# Patient Record
Sex: MALE | Race: WHITE | NOT HISPANIC OR LATINO | Employment: FULL TIME | URBAN - METROPOLITAN AREA
[De-identification: names, ages, dates, MRNs, and addresses within clinical notes are randomized per-mention and may not be internally consistent; named-entity substitution may affect disease eponyms.]

---

## 2022-10-14 ENCOUNTER — HOSPITAL ENCOUNTER (INPATIENT)
Facility: HOSPITAL | Age: 57
LOS: 3 days | Discharge: HOME | DRG: 854 | End: 2022-10-17
Attending: EMERGENCY MEDICINE | Admitting: INTERNAL MEDICINE
Payer: MEDICAID

## 2022-10-14 DIAGNOSIS — N20.0 NEPHROLITHIASIS: ICD-10-CM

## 2022-10-14 DIAGNOSIS — R10.9 FLANK PAIN: Primary | ICD-10-CM

## 2022-10-14 DIAGNOSIS — N20.1 URETERAL CALCULI: ICD-10-CM

## 2022-10-14 DIAGNOSIS — N20.1 URETEROLITHIASIS: ICD-10-CM

## 2022-10-14 LAB
ABS NEUT (OLG): 9.94 X10(3)/MCL (ref 2.1–9.2)
ALBUMIN SERPL-MCNC: 3.3 GM/DL (ref 3.5–5)
ALBUMIN/GLOB SERPL: 0.9 RATIO (ref 1.1–2)
ALP SERPL-CCNC: 75 UNIT/L (ref 40–150)
ALT SERPL-CCNC: 33 UNIT/L (ref 0–55)
APPEARANCE UR: CLEAR
AST SERPL-CCNC: 26 UNIT/L (ref 5–34)
BACTERIA #/AREA URNS AUTO: ABNORMAL /HPF
BILIRUB UR QL STRIP.AUTO: NEGATIVE MG/DL
BILIRUBIN DIRECT+TOT PNL SERPL-MCNC: 0.9 MG/DL
BUN SERPL-MCNC: 12.6 MG/DL (ref 8.4–25.7)
CALCIUM SERPL-MCNC: 9.1 MG/DL (ref 8.4–10.2)
CHLORIDE SERPL-SCNC: 99 MMOL/L (ref 98–107)
CO2 SERPL-SCNC: 26 MMOL/L (ref 22–29)
COLOR UR AUTO: ABNORMAL
CREAT SERPL-MCNC: 1.41 MG/DL (ref 0.73–1.18)
EOSINOPHIL NFR BLD MANUAL: 0.42 X10(3)/MCL (ref 0–0.9)
EOSINOPHIL NFR BLD MANUAL: 3 %
ERYTHROCYTE [DISTWIDTH] IN BLOOD BY AUTOMATED COUNT: 12.1 % (ref 11.5–17)
GFR SERPLBLD CREATININE-BSD FMLA CKD-EPI: 58 MLS/MIN/1.73/M2
GLOBULIN SER-MCNC: 3.8 GM/DL (ref 2.4–3.5)
GLUCOSE SERPL-MCNC: 100 MG/DL (ref 74–100)
GLUCOSE UR QL STRIP.AUTO: NEGATIVE MG/DL
HCT VFR BLD AUTO: 41.5 % (ref 42–52)
HGB BLD-MCNC: 13.8 GM/DL (ref 14–18)
IMM GRANULOCYTES # BLD AUTO: 0.1 X10(3)/MCL (ref 0–0.04)
IMM GRANULOCYTES NFR BLD AUTO: 0.7 %
INSTRUMENT WBC (OLG): 14 X10(3)/MCL
KETONES UR QL STRIP.AUTO: ABNORMAL MG/DL
LEUKOCYTE ESTERASE UR QL STRIP.AUTO: ABNORMAL UNIT/L
LYMPHOCYTES NFR BLD MANUAL: 0.98 X10(3)/MCL
LYMPHOCYTES NFR BLD MANUAL: 7 %
MCH RBC QN AUTO: 31.4 PG (ref 27–31)
MCHC RBC AUTO-ENTMCNC: 33.3 MG/DL (ref 33–36)
MCV RBC AUTO: 94.3 FL (ref 80–94)
MONOCYTES NFR BLD MANUAL: 19 %
MONOCYTES NFR BLD MANUAL: 2.66 X10(3)/MCL (ref 0.1–1.3)
NEUTROPHILS NFR BLD MANUAL: 71 %
NITRITE UR QL STRIP.AUTO: NEGATIVE
NRBC BLD AUTO-RTO: 0 %
PH UR STRIP.AUTO: 5.5 [PH]
PLATELET # BLD AUTO: 297 X10(3)/MCL (ref 130–400)
PLATELET # BLD EST: NORMAL 10*3/UL
PMV BLD AUTO: 11.5 FL (ref 7.4–10.4)
POTASSIUM SERPL-SCNC: 4.7 MMOL/L (ref 3.5–5.1)
PROT SERPL-MCNC: 7.1 GM/DL (ref 6.4–8.3)
PROT UR QL STRIP.AUTO: ABNORMAL MG/DL
RBC # BLD AUTO: 4.4 X10(6)/MCL (ref 4.7–6.1)
RBC #/AREA URNS AUTO: <5 /HPF
RBC MORPH BLD: NORMAL
RBC UR QL AUTO: NEGATIVE UNIT/L
SARS-COV-2 RDRP RESP QL NAA+PROBE: NEGATIVE
SODIUM SERPL-SCNC: 130 MMOL/L (ref 136–145)
SP GR UR STRIP.AUTO: 1.04 (ref 1–1.03)
SQUAMOUS #/AREA URNS AUTO: <5 /HPF
UROBILINOGEN UR STRIP-ACNC: 1 MG/DL
WBC # SPEC AUTO: 14 X10(3)/MCL (ref 4.5–11.5)
WBC #/AREA URNS AUTO: 7 /HPF

## 2022-10-14 PROCEDURE — 81001 URINALYSIS AUTO W/SCOPE: CPT | Performed by: PHYSICIAN ASSISTANT

## 2022-10-14 PROCEDURE — 96361 HYDRATE IV INFUSION ADD-ON: CPT

## 2022-10-14 PROCEDURE — 63600175 PHARM REV CODE 636 W HCPCS: Performed by: STUDENT IN AN ORGANIZED HEALTH CARE EDUCATION/TRAINING PROGRAM

## 2022-10-14 PROCEDURE — 25000003 PHARM REV CODE 250: Performed by: PHYSICIAN ASSISTANT

## 2022-10-14 PROCEDURE — 63600175 PHARM REV CODE 636 W HCPCS: Performed by: INTERNAL MEDICINE

## 2022-10-14 PROCEDURE — 99285 EMERGENCY DEPT VISIT HI MDM: CPT | Mod: 25

## 2022-10-14 PROCEDURE — 96376 TX/PRO/DX INJ SAME DRUG ADON: CPT

## 2022-10-14 PROCEDURE — 87635 SARS-COV-2 COVID-19 AMP PRB: CPT | Performed by: STUDENT IN AN ORGANIZED HEALTH CARE EDUCATION/TRAINING PROGRAM

## 2022-10-14 PROCEDURE — 11000001 HC ACUTE MED/SURG PRIVATE ROOM

## 2022-10-14 PROCEDURE — 85027 COMPLETE CBC AUTOMATED: CPT | Performed by: PHYSICIAN ASSISTANT

## 2022-10-14 PROCEDURE — 80053 COMPREHEN METABOLIC PANEL: CPT | Performed by: PHYSICIAN ASSISTANT

## 2022-10-14 PROCEDURE — 96374 THER/PROPH/DIAG INJ IV PUSH: CPT

## 2022-10-14 PROCEDURE — 36415 COLL VENOUS BLD VENIPUNCTURE: CPT | Performed by: PHYSICIAN ASSISTANT

## 2022-10-14 PROCEDURE — 25000003 PHARM REV CODE 250: Performed by: INTERNAL MEDICINE

## 2022-10-14 RX ORDER — SODIUM CHLORIDE, SODIUM LACTATE, POTASSIUM CHLORIDE, CALCIUM CHLORIDE 600; 310; 30; 20 MG/100ML; MG/100ML; MG/100ML; MG/100ML
INJECTION, SOLUTION INTRAVENOUS CONTINUOUS
Status: ACTIVE | OUTPATIENT
Start: 2022-10-14 | End: 2022-10-15

## 2022-10-14 RX ORDER — MORPHINE SULFATE 4 MG/ML
2 INJECTION, SOLUTION INTRAMUSCULAR; INTRAVENOUS EVERY 4 HOURS PRN
Status: DISCONTINUED | OUTPATIENT
Start: 2022-10-14 | End: 2022-10-17 | Stop reason: HOSPADM

## 2022-10-14 RX ORDER — TAMSULOSIN HYDROCHLORIDE 0.4 MG/1
1 CAPSULE ORAL NIGHTLY
COMMUNITY
Start: 2022-10-12

## 2022-10-14 RX ORDER — LEVOFLOXACIN 500 MG/1
500 TABLET, FILM COATED ORAL DAILY
Status: DISCONTINUED | OUTPATIENT
Start: 2022-10-15 | End: 2022-10-17 | Stop reason: HOSPADM

## 2022-10-14 RX ORDER — TALC
6 POWDER (GRAM) TOPICAL NIGHTLY PRN
Status: DISCONTINUED | OUTPATIENT
Start: 2022-10-14 | End: 2022-10-17 | Stop reason: HOSPADM

## 2022-10-14 RX ORDER — HYDROCODONE BITARTRATE AND ACETAMINOPHEN 10; 325 MG/1; MG/1
1 TABLET ORAL EVERY 4 HOURS PRN
COMMUNITY
Start: 2022-10-13

## 2022-10-14 RX ORDER — HYDROCODONE BITARTRATE AND ACETAMINOPHEN 10; 325 MG/1; MG/1
1 TABLET ORAL EVERY 4 HOURS PRN
Status: DISCONTINUED | OUTPATIENT
Start: 2022-10-14 | End: 2022-10-17 | Stop reason: HOSPADM

## 2022-10-14 RX ORDER — HYDROMORPHONE HYDROCHLORIDE 2 MG/ML
0.5 INJECTION, SOLUTION INTRAMUSCULAR; INTRAVENOUS; SUBCUTANEOUS
Status: COMPLETED | OUTPATIENT
Start: 2022-10-14 | End: 2022-10-14

## 2022-10-14 RX ORDER — LEVOFLOXACIN 500 MG/1
500 TABLET, FILM COATED ORAL DAILY
Status: ON HOLD | COMMUNITY
Start: 2022-10-12 | End: 2022-10-17 | Stop reason: SDUPTHER

## 2022-10-14 RX ORDER — HYDROMORPHONE HYDROCHLORIDE 2 MG/ML
1 INJECTION, SOLUTION INTRAMUSCULAR; INTRAVENOUS; SUBCUTANEOUS EVERY 4 HOURS PRN
Status: DISCONTINUED | OUTPATIENT
Start: 2022-10-14 | End: 2022-10-17 | Stop reason: HOSPADM

## 2022-10-14 RX ORDER — ONDANSETRON 2 MG/ML
4 INJECTION INTRAMUSCULAR; INTRAVENOUS EVERY 8 HOURS PRN
Status: DISCONTINUED | OUTPATIENT
Start: 2022-10-14 | End: 2022-10-17 | Stop reason: HOSPADM

## 2022-10-14 RX ORDER — TAMSULOSIN HYDROCHLORIDE 0.4 MG/1
1 CAPSULE ORAL NIGHTLY
Status: DISCONTINUED | OUTPATIENT
Start: 2022-10-14 | End: 2022-10-17 | Stop reason: HOSPADM

## 2022-10-14 RX ORDER — SODIUM CHLORIDE 0.9 % (FLUSH) 0.9 %
10 SYRINGE (ML) INJECTION
Status: DISCONTINUED | OUTPATIENT
Start: 2022-10-14 | End: 2022-10-17 | Stop reason: HOSPADM

## 2022-10-14 RX ADMIN — HYDROCODONE BITARTRATE AND ACETAMINOPHEN 1 TABLET: 10; 325 TABLET ORAL at 09:10

## 2022-10-14 RX ADMIN — HYDROMORPHONE HYDROCHLORIDE 0.5 MG: 2 INJECTION INTRAMUSCULAR; INTRAVENOUS; SUBCUTANEOUS at 12:10

## 2022-10-14 RX ADMIN — SODIUM CHLORIDE, POTASSIUM CHLORIDE, SODIUM LACTATE AND CALCIUM CHLORIDE 1000 ML: 600; 310; 30; 20 INJECTION, SOLUTION INTRAVENOUS at 02:10

## 2022-10-14 RX ADMIN — TAMSULOSIN HYDROCHLORIDE 0.4 MG: 0.4 CAPSULE ORAL at 09:10

## 2022-10-14 RX ADMIN — HYDROMORPHONE HYDROCHLORIDE 0.5 MG: 2 INJECTION INTRAMUSCULAR; INTRAVENOUS; SUBCUTANEOUS at 04:10

## 2022-10-14 RX ADMIN — SODIUM CHLORIDE, POTASSIUM CHLORIDE, SODIUM LACTATE AND CALCIUM CHLORIDE: 600; 310; 30; 20 INJECTION, SOLUTION INTRAVENOUS at 09:10

## 2022-10-14 RX ADMIN — SODIUM CHLORIDE 1000 ML: 9 INJECTION, SOLUTION INTRAVENOUS at 11:10

## 2022-10-14 NOTE — CONSULTS
Steven Lejeune 1965  [id number]  10/14/2022    CONSULTING PHYSICIAN: ED    CC:  Left ureteral stone      HPI:  The patient is a 57-year-old man who was working and cut off [place].  He developed left flank pain on Tuesday.  He was seen in the emergency department and had a CT scan which demonstrated a left distal ureteral stone.  Patient had a scheduled outpatient follow-up with his urologist in [place] in November.  However he decided he could not wait that long and presented to our emergency department.  Pain is improved with IV pain medicines.  No fever or chills.  No voiding symptoms or blood in the urine.  No other complaints at this time      No past medical history on file.    No past surgical history on file.    No family history on file.         No current facility-administered medications for this encounter.     Current Outpatient Medications   Medication Sig Dispense Refill    HYDROcodone-acetaminophen (NORCO)  mg per tablet Take 1 tablet by mouth every 4 (four) hours as needed. for pain.      levoFLOXacin (LEVAQUIN) 500 MG tablet Take 500 mg by mouth once daily.      tamsulosin (FLOMAX) 0.4 mg Cap Take 1 capsule by mouth every evening. at bedtime         Review of patient's allergies indicates:  No Known Allergies    ROS: 12 point review of systems negative other than the HPI      PHYSICAL EXAM:  Vitals:    10/14/22 1216 10/14/22 1400 10/14/22 1612 10/14/22 1656   BP:  (!) 114/58 132/79    BP Location:       Patient Position:       Pulse:  104 97    Resp: (!) 21 (!) 21  19   Temp:       TempSrc:       SpO2:  97% 97%    Weight:       Height:             Intake/Output Summary (Last 24 hours) at 10/14/2022 1733  Last data filed at 10/14/2022 1503  Gross per 24 hour   Intake 1999 ml   Output --   Net 1999 ml       GEN: WN/WD NAD  HEENT: NCAT, PERRLA, EOMI, OP clear, nares patent  CV: RRR  RESP: Even and unlabored  ABD:  Soft nontender nondistended  :  Deferred  EXT: no C/C/E  NEURO: no focal  brandyn, WOOD, AAOx4      LABS:  [unfilled]      Recent Results (from the past 24 hour(s))   Urinalysis, Reflex to Urine Culture Urine, Clean Catch    Collection Time: 10/14/22 11:05 AM    Specimen: Urine   Result Value Ref Range    Color, UA Dark Yellow Yellow, Light-Yellow, Dark Yellow, Gloria, Straw    Appearance, UA Clear Clear    Specific Gravity, UA 1.037 (H) 1.001 - 1.030    pH, UA 5.5 5.0, 5.5, 6.0, 6.5, 7.0, 7.5, 8.0, 8.5    Protein, UA 1+ (A) Negative mg/dL    Glucose, UA Negative Negative, Normal mg/dL    Ketones, UA Trace (A) Negative mg/dL    Blood, UA Negative Negative unit/L    Bilirubin, UA Negative Negative mg/dL    Urobilinogen, UA 1.0 0.2, 1.0, Normal mg/dL    Nitrites, UA Negative Negative    Leukocyte Esterase, UA Trace (A) Negative unit/L   Urinalysis, Microscopic    Collection Time: 10/14/22 11:05 AM   Result Value Ref Range    RBC, UA <5 <=5 /HPF    WBC, UA 7 (H) <=5 /HPF    Squamous Epithelial Cells, UA <5 <=5 /HPF    Bacteria, UA None Seen None Seen, Rare, Occasional /HPF   CBC with Differential    Collection Time: 10/14/22 11:58 AM   Result Value Ref Range    WBC 14.0 (H) 4.5 - 11.5 x10(3)/mcL    RBC 4.40 (L) 4.70 - 6.10 x10(6)/mcL    Hgb 13.8 (L) 14.0 - 18.0 gm/dL    Hct 41.5 (L) 42.0 - 52.0 %    MCV 94.3 (H) 80.0 - 94.0 fL    MCH 31.4 (H) 27.0 - 31.0 pg    MCHC 33.3 33.0 - 36.0 mg/dL    RDW 12.1 11.5 - 17.0 %    Platelet 297 130 - 400 x10(3)/mcL    MPV 11.5 (H) 7.4 - 10.4 fL    IG# 0.10 (H) 0 - 0.04 x10(3)/mcL    IG% 0.7 %    NRBC% 0.0 %   Manual Differential    Collection Time: 10/14/22 11:58 AM   Result Value Ref Range    Neut Man 71 %    Lymph Man 7 %    Monocyte Man 19 %    Eos Man 3 %    Instr WBC 14 x10(3)/mcL    Abs Mono 2.66 (H) 0.1 - 1.3 x10(3)/mcL    Abs Eos  0.42 0 - 0.9 x10(3)/mcL    Abs Lymp 0.98 0.6 - 4.6 x10(3)/mcL    Abs Neut 9.94 (H) 2.1 - 9.2 x10(3)/mcL    RBC Morph Normal Normal    Platelet Est Normal Normal, Adequate   Comprehensive metabolic panel    Collection Time:  10/14/22 12:16 PM   Result Value Ref Range    Sodium Level 130 (L) 136 - 145 mmol/L    Potassium Level 4.7 3.5 - 5.1 mmol/L    Chloride 99 98 - 107 mmol/L    Carbon Dioxide 26 22 - 29 mmol/L    Glucose Level 100 74 - 100 mg/dL    Blood Urea Nitrogen 12.6 8.4 - 25.7 mg/dL    Creatinine 1.41 (H) 0.73 - 1.18 mg/dL    Calcium Level Total 9.1 8.4 - 10.2 mg/dL    Protein Total 7.1 6.4 - 8.3 gm/dL    Albumin Level 3.3 (L) 3.5 - 5.0 gm/dL    Globulin 3.8 (H) 2.4 - 3.5 gm/dL    Albumin/Globulin Ratio 0.9 (L) 1.1 - 2.0 ratio    Bilirubin Total 0.9 <=1.5 mg/dL    Alkaline Phosphatase 75 40 - 150 unit/L    Alanine Aminotransferase 33 0 - 55 unit/L    Aspartate Aminotransferase 26 5 - 34 unit/L    eGFR 58 mls/min/1.73/m2         IMAGING:  Imaging Results    None       Although I do not have the report from his prior imaging, I do have the images themselves to review.  He has a lower pole left partial staghorn calculus not creating any obstruction or problems.  He also has a left distal ureteral stone.      ASSESSMENT:  Ureteral stone    PLAN:  I did have a long conversation with the patient regarding his imaging findings.  He is having pain despite IV pain medicines.  We will keep him NPO overnight plane ureteroscopy laser lithotripsy.  Risks benefits rationale were discussed.  Patient does understand this will treat his distal ureteral stone and then he can return to his primary urologist for treatment of his partial staghorn calculus.  All questions are answered to his satisfaction.  Consents were signed.  Risks benefits rationale discussed    Chavo Flores MD

## 2022-10-14 NOTE — FIRST PROVIDER EVALUATION
"Medical screening examination initiated.  I have conducted a focused provider triage encounter, findings are as follows:    Chief Complaint   Patient presents with    Flank Pain    Vomiting    Nausea     Left flank pain x5 days ago, went to [facility] ER 2 days ago and was dx with ureterolithiasis, CT scan done. Referred to urologist however patient reports pain worse, denies N/V.      Brief history of present illness:  57 y.o. male presents to the ED with worsening left flank for the last 5 days. Seen on 10/12 at [facility] in [place] where he was diagnosed with kdiney stone and dc'ed home. CT scan done noting severe left hydronephrosis s/t 8mm obstructing stone with possible pyelonephritis; dc'ed home with pain medication and urology f/u however not able to get appt until November. Notes decrease in urination. Denies fever, n/v    Vitals:    10/14/22 1029   BP: 119/64   BP Location: Left arm   Patient Position: Sitting   Pulse: 101   Resp: 17   Temp: 98.2 °F (36.8 °C)   TempSrc: Oral   SpO2: 99%   Weight: 83 kg (183 lb)   Height: 5' 11.65" (1.82 m)       Pertinent physical exam:  Awake, alert, ambulatory, non-labored respirations    Brief workup plan:  labs, UA    Preliminary workup initiated; this workup will be continued and followed by the physician or advanced practice provider that is assigned to the patient when roomed.  "

## 2022-10-14 NOTE — ED PROVIDER NOTES
Encounter Date: 10/14/2022    SCRIBE #1 NOTE: I, Bahman Abbey, am scribing for, and in the presence of,  Simeon Rodrigues IV, MD. I have scribed the following portions of the note - Other sections scribed: HPI, ROS, PE.     History     Chief Complaint   Patient presents with    Flank Pain    Vomiting    Nausea     Left flank pain x5 days ago, went to [facility] ER 2 days ago and was dx with ureterolithiasis, CT scan done. Referred to urologist however patient reports pain worse, denies N/V.      56 y/o male with a recent diagnosis kidney stones presents to the ED with sharp, worsening left flank pain - radiates to LLQ. + dark urine. Denies nausea, vomiting, and fever. Associated dysuria, hematuria. Reports he couldn't get urology follow up until November.      Chart review: Pt was diagnosed with kidney stones and a UTI during his visit at [facility] ER 2 days ago. His CT showed a left-sided 8mm ureteral stone with hydronephrosis and a left-sided 2cm staghorn renal calculus. He was prescribed Norco, Flomax, and Levaquin and told to follow up with a urologist.     The history is provided by the patient. No  was used.   Flank Pain  This is a recurrent problem. Episode onset: worsened today. The problem occurs constantly. Progression since onset: worsening. Associated symptoms include abdominal pain. Pertinent negatives include no chest pain and no shortness of breath.   Review of patient's allergies indicates:  No Known Allergies  History reviewed. No pertinent past medical history.  History reviewed. No pertinent surgical history.  History reviewed. No pertinent family history.     Review of Systems   Constitutional:  Negative for chills and fever.   HENT:  Negative for congestion, rhinorrhea and sore throat.    Eyes:  Negative for visual disturbance.   Respiratory:  Negative for cough and shortness of breath.    Cardiovascular:  Negative for chest pain.   Gastrointestinal:  Positive for  abdominal pain. Negative for nausea and vomiting.   Genitourinary:  Positive for decreased urine volume and flank pain (Left). Negative for dysuria and hematuria.        Pain while urinating   Musculoskeletal:  Positive for back pain (Left). Negative for joint swelling.   Skin:  Negative for rash.   Neurological:  Negative for weakness.   Psychiatric/Behavioral:  Negative for confusion.    All other systems reviewed and are negative.    Physical Exam     Initial Vitals [10/14/22 1029]   BP Pulse Resp Temp SpO2   119/64 101 17 98.2 °F (36.8 °C) 99 %      MAP       --         Physical Exam    Nursing note and vitals reviewed.  Constitutional: He is not diaphoretic. No distress.   HENT:   Head: Normocephalic and atraumatic.   Eyes: EOM are normal. Pupils are equal, round, and reactive to light.   Neck: Neck supple.   Normal range of motion.  Cardiovascular:  Normal rate and regular rhythm.           No murmur heard.  Pulmonary/Chest: Breath sounds normal. No respiratory distress. He has no wheezes. He has no rales.   Abdominal: Abdomen is soft. He exhibits no distension. There is abdominal tenderness in the left lower quadrant.   There is left CVA tenderness.   Musculoskeletal:         General: No edema. Normal range of motion.      Cervical back: Normal range of motion and neck supple.     Neurological: He is alert and oriented to person, place, and time. He has normal strength. No cranial nerve deficit.   Skin: Skin is warm. No rash noted.   Psychiatric: He has a normal mood and affect.       ED Course   Procedures  Labs Reviewed   COMPREHENSIVE METABOLIC PANEL - Abnormal; Notable for the following components:       Result Value    Sodium Level 130 (*)     Creatinine 1.41 (*)     Albumin Level 3.3 (*)     Globulin 3.8 (*)     Albumin/Globulin Ratio 0.9 (*)     All other components within normal limits   CBC WITH DIFFERENTIAL - Abnormal; Notable for the following components:    WBC 14.0 (*)     RBC 4.40 (*)     Hgb  13.8 (*)     Hct 41.5 (*)     MCV 94.3 (*)     MCH 31.4 (*)     MPV 11.5 (*)     IG# 0.10 (*)     All other components within normal limits   URINALYSIS, REFLEX TO URINE CULTURE - Abnormal; Notable for the following components:    Specific Gravity, UA 1.037 (*)     Protein, UA 1+ (*)     Ketones, UA Trace (*)     Leukocyte Esterase, UA Trace (*)     All other components within normal limits   URINALYSIS, MICROSCOPIC - Abnormal; Notable for the following components:    WBC, UA 7 (*)     All other components within normal limits   MANUAL DIFFERENTIAL - Abnormal; Notable for the following components:    Abs Mono 2.66 (*)     Abs Neut 9.94 (*)     All other components within normal limits   SARS-COV-2 RNA AMPLIFICATION, QUAL - Normal    Narrative:     The IDNOW COVID-19 assay is a rapid molecular in vitro diagnostic test utilizing an isothermal nucleic acid amplification technology intended for the qualitative detection of nucleic acid from the SARS-CoV-2 viral RNA in direct nasal, nasopharyngeal or throat swabs from individuals who are suspected of COVID-19 by their healthcare provider.   CBC W/ AUTO DIFFERENTIAL    Narrative:     The following orders were created for panel order CBC auto differential.  Procedure                               Abnormality         Status                     ---------                               -----------         ------                     CBC with Differential[[id number]]        Abnormal            Final result               Manual Differential[[id number]]          Abnormal            Final result                 Please view results for these tests on the individual orders.          Imaging Results    None          Medications   sodium chloride 0.9% bolus 1,000 mL (0 mLs Intravenous Stopped 10/14/22 1254)   HYDROmorphone (PF) injection 0.5 mg (0.5 mg Intravenous Given 10/14/22 1216)   lactated ringers bolus 1,000 mL (0 mLs Intravenous Stopped 10/14/22 1503)   HYDROmorphone (PF)  injection 0.5 mg (0.5 mg Intravenous Given 10/14/22 7526)     Medical Decision Making:   History:   Old Medical Records: I decided to obtain old medical records.  Initial Assessment:   56 yo with 8 mm ureteral stone, L staghorn calculus diagnosed at OSH 2 days ago. Pain unrelieved here, reports cannot get urology follow up until November. Consulted urology - recommend admission, procedure in AM. Vitals wnl. Mild BRYAN. no signs of infection at this time on UA although has been on abx outpatient.   Clinical Tests:   Lab Tests: Ordered and Reviewed  Radiological Study: Ordered and Reviewed  ED Management:  IV opioids   IVF   Other:   I have discussed this case with another health care provider.       <> Summary of the Discussion: Urology  Hospitalist      Scribe Attestation:   Scribe #1: I performed the above scribed service and the documentation accurately describes the services I performed. I attest to the accuracy of the note.    Attending Attestation:           Physician Attestation for Scribe:  Physician Attestation Statement for Scribe #1: Simeon IZQUIERDO IV, MD, reviewed documentation, as scribed by Bahman Potter in my presence, and it is both accurate and complete.           ED Course as of 10/14/22 2137   Fri Oct 14, 2022   1330 Urology paged [JG]   1447 NP Veena will discuss the case with Dr. Flores [JG]   1633 Urology paged [JG]   1645 Hospitalist paged [JG]   1649 Urology recommends hospitalist admission, NPO after midnight  [AC]   1056 Patient admitted to Dr. Giovani Dominique [JG]      ED Course User Index  [AC] Simeon Rodrigues IV, MD  [JG] Bahman Potter                   Clinical Impression:   Final diagnoses:  [R10.9] Flank pain (Primary)  [N20.1] Ureterolithiasis  [N20.0] Nephrolithiasis      ED Disposition Condition    Admit Stable        Simeon IZQUIERDO MD personally performed the history, PE, MDM, and procedures as documented above and agree with the scribe's documentation.           Simeon Rodrigues  MD DAQUAN  10/14/22 3985

## 2022-10-15 ENCOUNTER — ANESTHESIA EVENT (OUTPATIENT)
Dept: SURGERY | Facility: HOSPITAL | Age: 57
DRG: 854 | End: 2022-10-15
Payer: MEDICAID

## 2022-10-15 ENCOUNTER — ANESTHESIA (OUTPATIENT)
Dept: SURGERY | Facility: HOSPITAL | Age: 57
DRG: 854 | End: 2022-10-15
Payer: MEDICAID

## 2022-10-15 LAB
ALBUMIN SERPL-MCNC: 2.6 GM/DL (ref 3.5–5)
ALBUMIN/GLOB SERPL: 0.7 RATIO (ref 1.1–2)
ALP SERPL-CCNC: 80 UNIT/L (ref 40–150)
ALT SERPL-CCNC: 29 UNIT/L (ref 0–55)
AST SERPL-CCNC: 23 UNIT/L (ref 5–34)
BASOPHILS # BLD AUTO: 0.04 X10(3)/MCL (ref 0–0.2)
BASOPHILS NFR BLD AUTO: 0.3 %
BILIRUBIN DIRECT+TOT PNL SERPL-MCNC: 1 MG/DL
BUN SERPL-MCNC: 12.8 MG/DL (ref 8.4–25.7)
CALCIUM SERPL-MCNC: 9.2 MG/DL (ref 8.4–10.2)
CHLORIDE SERPL-SCNC: 98 MMOL/L (ref 98–107)
CO2 SERPL-SCNC: 26 MMOL/L (ref 22–29)
CREAT SERPL-MCNC: 1.21 MG/DL (ref 0.73–1.18)
EOSINOPHIL # BLD AUTO: 0.33 X10(3)/MCL (ref 0–0.9)
EOSINOPHIL NFR BLD AUTO: 2.6 %
ERYTHROCYTE [DISTWIDTH] IN BLOOD BY AUTOMATED COUNT: 11.9 % (ref 11.5–17)
GFR SERPLBLD CREATININE-BSD FMLA CKD-EPI: >60 MLS/MIN/1.73/M2
GLOBULIN SER-MCNC: 3.7 GM/DL (ref 2.4–3.5)
GLUCOSE SERPL-MCNC: 99 MG/DL (ref 74–100)
HCT VFR BLD AUTO: 36 % (ref 42–52)
HGB BLD-MCNC: 12.1 GM/DL (ref 14–18)
IMM GRANULOCYTES # BLD AUTO: 0.05 X10(3)/MCL (ref 0–0.04)
IMM GRANULOCYTES NFR BLD AUTO: 0.4 %
LYMPHOCYTES # BLD AUTO: 1.08 X10(3)/MCL (ref 0.6–4.6)
LYMPHOCYTES NFR BLD AUTO: 8.6 %
MCH RBC QN AUTO: 31.1 PG (ref 27–31)
MCHC RBC AUTO-ENTMCNC: 33.6 MG/DL (ref 33–36)
MCV RBC AUTO: 92.5 FL (ref 80–94)
MONOCYTES # BLD AUTO: 2.01 X10(3)/MCL (ref 0.1–1.3)
MONOCYTES NFR BLD AUTO: 16 %
NEUTROPHILS # BLD AUTO: 9.1 X10(3)/MCL (ref 2.1–9.2)
NEUTROPHILS NFR BLD AUTO: 72.1 %
NRBC BLD AUTO-RTO: 0 %
PLATELET # BLD AUTO: 361 X10(3)/MCL (ref 130–400)
PMV BLD AUTO: 11.1 FL (ref 7.4–10.4)
POTASSIUM SERPL-SCNC: 4.1 MMOL/L (ref 3.5–5.1)
PROT SERPL-MCNC: 6.3 GM/DL (ref 6.4–8.3)
RBC # BLD AUTO: 3.89 X10(6)/MCL (ref 4.7–6.1)
SODIUM SERPL-SCNC: 132 MMOL/L (ref 136–145)
WBC # SPEC AUTO: 12.6 X10(3)/MCL (ref 4.5–11.5)

## 2022-10-15 PROCEDURE — C1758 CATHETER, URETERAL: HCPCS | Performed by: UROLOGY

## 2022-10-15 PROCEDURE — C1769 GUIDE WIRE: HCPCS | Performed by: UROLOGY

## 2022-10-15 PROCEDURE — 85025 COMPLETE CBC W/AUTO DIFF WBC: CPT | Performed by: INTERNAL MEDICINE

## 2022-10-15 PROCEDURE — 63600175 PHARM REV CODE 636 W HCPCS: Performed by: UROLOGY

## 2022-10-15 PROCEDURE — 37000009 HC ANESTHESIA EA ADD 15 MINS: Performed by: UROLOGY

## 2022-10-15 PROCEDURE — C2617 STENT, NON-COR, TEM W/O DEL: HCPCS | Performed by: UROLOGY

## 2022-10-15 PROCEDURE — 87088 URINE BACTERIA CULTURE: CPT | Performed by: INTERNAL MEDICINE

## 2022-10-15 PROCEDURE — 80053 COMPREHEN METABOLIC PANEL: CPT | Performed by: INTERNAL MEDICINE

## 2022-10-15 PROCEDURE — 63600175 PHARM REV CODE 636 W HCPCS: Performed by: INTERNAL MEDICINE

## 2022-10-15 PROCEDURE — 37000008 HC ANESTHESIA 1ST 15 MINUTES: Performed by: UROLOGY

## 2022-10-15 PROCEDURE — 25000003 PHARM REV CODE 250: Performed by: UROLOGY

## 2022-10-15 PROCEDURE — 36000706: Performed by: UROLOGY

## 2022-10-15 PROCEDURE — 36415 COLL VENOUS BLD VENIPUNCTURE: CPT | Performed by: INTERNAL MEDICINE

## 2022-10-15 PROCEDURE — 11000001 HC ACUTE MED/SURG PRIVATE ROOM

## 2022-10-15 PROCEDURE — 25000003 PHARM REV CODE 250: Performed by: INTERNAL MEDICINE

## 2022-10-15 PROCEDURE — 36000707: Performed by: UROLOGY

## 2022-10-15 PROCEDURE — 63600175 PHARM REV CODE 636 W HCPCS

## 2022-10-15 PROCEDURE — 71000033 HC RECOVERY, INTIAL HOUR: Performed by: UROLOGY

## 2022-10-15 PROCEDURE — C1726 CATH, BAL DIL, NON-VASCULAR: HCPCS | Performed by: UROLOGY

## 2022-10-15 DEVICE — STENT SET URETERAL 6X26CM: Type: IMPLANTABLE DEVICE | Site: URETER | Status: FUNCTIONAL

## 2022-10-15 RX ORDER — DEXAMETHASONE SODIUM PHOSPHATE 4 MG/ML
INJECTION, SOLUTION INTRA-ARTICULAR; INTRALESIONAL; INTRAMUSCULAR; INTRAVENOUS; SOFT TISSUE
Status: DISCONTINUED | OUTPATIENT
Start: 2022-10-15 | End: 2022-10-15

## 2022-10-15 RX ORDER — HYOSCYAMINE SULFATE 0.12 MG/1
0.12 TABLET SUBLINGUAL EVERY 4 HOURS PRN
Status: DISCONTINUED | OUTPATIENT
Start: 2022-10-15 | End: 2022-10-17 | Stop reason: HOSPADM

## 2022-10-15 RX ORDER — ONDANSETRON 2 MG/ML
INJECTION INTRAMUSCULAR; INTRAVENOUS
Status: DISCONTINUED | OUTPATIENT
Start: 2022-10-15 | End: 2022-10-15

## 2022-10-15 RX ORDER — DIPHENHYDRAMINE HYDROCHLORIDE 50 MG/ML
25 INJECTION INTRAMUSCULAR; INTRAVENOUS EVERY 6 HOURS PRN
Status: DISCONTINUED | OUTPATIENT
Start: 2022-10-15 | End: 2022-10-15

## 2022-10-15 RX ORDER — MEPERIDINE HYDROCHLORIDE 25 MG/ML
12.5 INJECTION INTRAMUSCULAR; INTRAVENOUS; SUBCUTANEOUS EVERY 10 MIN PRN
Status: DISCONTINUED | OUTPATIENT
Start: 2022-10-15 | End: 2022-10-15

## 2022-10-15 RX ORDER — FENTANYL CITRATE 50 UG/ML
INJECTION, SOLUTION INTRAMUSCULAR; INTRAVENOUS
Status: DISCONTINUED | OUTPATIENT
Start: 2022-10-15 | End: 2022-10-15

## 2022-10-15 RX ORDER — SODIUM CHLORIDE, SODIUM LACTATE, POTASSIUM CHLORIDE, CALCIUM CHLORIDE 600; 310; 30; 20 MG/100ML; MG/100ML; MG/100ML; MG/100ML
INJECTION, SOLUTION INTRAVENOUS CONTINUOUS PRN
Status: DISCONTINUED | OUTPATIENT
Start: 2022-10-15 | End: 2022-10-15

## 2022-10-15 RX ORDER — PROCHLORPERAZINE EDISYLATE 5 MG/ML
5 INJECTION INTRAMUSCULAR; INTRAVENOUS EVERY 30 MIN PRN
Status: DISCONTINUED | OUTPATIENT
Start: 2022-10-15 | End: 2022-10-15

## 2022-10-15 RX ORDER — PROPOFOL 10 MG/ML
INJECTION, EMULSION INTRAVENOUS
Status: DISCONTINUED | OUTPATIENT
Start: 2022-10-15 | End: 2022-10-15

## 2022-10-15 RX ORDER — METOCLOPRAMIDE HYDROCHLORIDE 5 MG/ML
10 INJECTION INTRAMUSCULAR; INTRAVENOUS EVERY 10 MIN PRN
Status: DISCONTINUED | OUTPATIENT
Start: 2022-10-15 | End: 2022-10-15

## 2022-10-15 RX ORDER — LEVOFLOXACIN 5 MG/ML
INJECTION, SOLUTION INTRAVENOUS
Status: DISPENSED
Start: 2022-10-15 | End: 2022-10-15

## 2022-10-15 RX ORDER — LEVOFLOXACIN 5 MG/ML
INJECTION, SOLUTION INTRAVENOUS
Status: DISCONTINUED | OUTPATIENT
Start: 2022-10-15 | End: 2022-10-15

## 2022-10-15 RX ORDER — HYDROMORPHONE HYDROCHLORIDE 2 MG/ML
0.4 INJECTION, SOLUTION INTRAMUSCULAR; INTRAVENOUS; SUBCUTANEOUS EVERY 10 MIN PRN
Status: DISCONTINUED | OUTPATIENT
Start: 2022-10-15 | End: 2022-10-15

## 2022-10-15 RX ORDER — MUPIROCIN 20 MG/G
OINTMENT TOPICAL 2 TIMES DAILY
Status: DISCONTINUED | OUTPATIENT
Start: 2022-10-15 | End: 2022-10-17 | Stop reason: HOSPADM

## 2022-10-15 RX ORDER — PHENAZOPYRIDINE HYDROCHLORIDE 100 MG/1
100 TABLET, FILM COATED ORAL
Status: DISCONTINUED | OUTPATIENT
Start: 2022-10-15 | End: 2022-10-17 | Stop reason: HOSPADM

## 2022-10-15 RX ORDER — MIDAZOLAM HYDROCHLORIDE 1 MG/ML
INJECTION INTRAMUSCULAR; INTRAVENOUS
Status: DISCONTINUED | OUTPATIENT
Start: 2022-10-15 | End: 2022-10-15

## 2022-10-15 RX ADMIN — MIDAZOLAM HYDROCHLORIDE 2 MG: 1 INJECTION, SOLUTION INTRAMUSCULAR; INTRAVENOUS at 09:10

## 2022-10-15 RX ADMIN — SODIUM CHLORIDE, POTASSIUM CHLORIDE, SODIUM LACTATE AND CALCIUM CHLORIDE: 600; 310; 30; 20 INJECTION, SOLUTION INTRAVENOUS at 09:10

## 2022-10-15 RX ADMIN — SODIUM CHLORIDE, POTASSIUM CHLORIDE, SODIUM LACTATE AND CALCIUM CHLORIDE: 600; 310; 30; 20 INJECTION, SOLUTION INTRAVENOUS at 01:10

## 2022-10-15 RX ADMIN — ONDANSETRON 4 MG: 2 INJECTION INTRAMUSCULAR; INTRAVENOUS at 09:10

## 2022-10-15 RX ADMIN — PHENAZOPYRIDINE HYDROCHLORIDE 100 MG: 100 TABLET ORAL at 05:10

## 2022-10-15 RX ADMIN — PHENAZOPYRIDINE HYDROCHLORIDE 100 MG: 100 TABLET ORAL at 01:10

## 2022-10-15 RX ADMIN — FENTANYL CITRATE 50 MCG: 50 INJECTION, SOLUTION INTRAMUSCULAR; INTRAVENOUS at 09:10

## 2022-10-15 RX ADMIN — LEVOFLOXACIN 500 MG: 500 INJECTION, SOLUTION INTRAVENOUS at 09:10

## 2022-10-15 RX ADMIN — LEVOFLOXACIN 500 MG: 500 TABLET, FILM COATED ORAL at 01:10

## 2022-10-15 RX ADMIN — PROPOFOL 200 MG: 10 INJECTION, EMULSION INTRAVENOUS at 09:10

## 2022-10-15 RX ADMIN — TAMSULOSIN HYDROCHLORIDE 0.4 MG: 0.4 CAPSULE ORAL at 08:10

## 2022-10-15 RX ADMIN — HYDROMORPHONE HYDROCHLORIDE 1 MG: 2 INJECTION INTRAMUSCULAR; INTRAVENOUS; SUBCUTANEOUS at 07:10

## 2022-10-15 RX ADMIN — MUPIROCIN: 20 OINTMENT TOPICAL at 08:10

## 2022-10-15 RX ADMIN — MUPIROCIN: 20 OINTMENT TOPICAL at 01:10

## 2022-10-15 RX ADMIN — HYDROCODONE BITARTRATE AND ACETAMINOPHEN 1 TABLET: 10; 325 TABLET ORAL at 08:10

## 2022-10-15 RX ADMIN — DEXAMETHASONE SODIUM PHOSPHATE 4 MG: 4 INJECTION, SOLUTION INTRA-ARTICULAR; INTRALESIONAL; INTRAMUSCULAR; INTRAVENOUS; SOFT TISSUE at 09:10

## 2022-10-15 NOTE — ANESTHESIA POSTPROCEDURE EVALUATION
Anesthesia Post Evaluation    Patient: Steven Lejeune    Procedure(s) Performed: Procedure(s) (LRB):  REMOVAL, CALCULUS, URETER, URETEROSCOPIC (Left)    Final Anesthesia Type: general      Patient location during evaluation: PACU  Patient participation: Yes- Able to Participate  Level of consciousness: awake and alert  Post-procedure vital signs: reviewed and stable  Pain management: adequate  Airway patency: patent    PONV status at discharge: No PONV  Anesthetic complications: no      Respiratory status: unassisted  Hydration status: euvolemic  Follow-up not needed.          Vitals Value Taken Time   /72 10/15/22 1011   Temp 37.1 °C (98.8 °F) 10/15/22 0437   Pulse 93 10/15/22 1014   Resp 11 10/15/22 1014   SpO2 100 % 10/15/22 1014   Vitals shown include unvalidated device data.      No case tracking events are documented in the log.      Pain/Shaun Score: Pain Rating Prior to Med Admin: 9 (10/15/2022  7:50 AM)  Pain Rating Post Med Admin: 0 (10/14/2022 10:54 PM)

## 2022-10-15 NOTE — OP NOTE
[facility] - Observation Unit  Surgery Department  Operative Note    SUMMARY     Patient Name: Steven Lejeune     : 1965     Date of the surgery: 10/14/2022 - 10/15/2022     Location of surgery: [facility] *         Date of Procedure: 10/15/2022     Procedure: Procedure(s) (LRB):  REMOVAL, CALCULUS, URETER, URETEROSCOPIC (Left)     Surgeon(s) and Role:     * Chavo Flores MD - Primary    Assisting Surgeon: None        Pre-Operative Diagnosis: Ureteral calculi [N20.1]    Post-Operative Diagnosis: Post-Op Diagnosis Codes:     * Ureteral calculi [N20.1]  Meatal stricture  hypospadias    Operations/ Therapeutic procedures: 1.  Cystoscopy with left double-J stent placement  2. Urethral dilation    Assisting Surgeon: None    Estimated Blood Loss (EBL): * No values recorded between 10/15/2022  9:35 AM and 10/15/2022 10:00 AM *           Anesthesia: General    Complications:  none    History of Clinical Illness:  57-year-old man admitted with a left distal ureteral stone and unremitting pain.  Also with a partial lower pole staghorn calculus on the left.  He was admitted for pain control and consented for operative intervention.  Risks benefits rationale were discussed and consents were signed.    Operative note:   After informed consent was obtained including the risks and benefits of the procedure the patient was transported to the operating theater and placed in the supine position on the operating table.  Once general endotracheal anesthesia was initiated patient was put in the dorsal lithotomy position with all bony surfaces appropriately padded and positioned.  Patient did receive preoperative antibiotics and a timeout was undertaken to assure the proper patient and procedure.    Exam demonstrated a coronal hypospadias and a narrow meatal opening which did not allow passage of my cystoscope.  I was able to dilate the meatus from 14 Cambodian up to 26 Cambodian with Maria Guadalupe dilators.   I was then able to advance my 22 Martiniquais rigid cystoscope per the urethra.  In the bulbar urethra there was a secondary moderate bulbar urethral stricture.  I was able to pass a wire through that stricture and then gently dilate over the wire with my scope passing my scope through the strictured area through the prostate into the bladder.  I drained the bladder filled the bladder survey the bladder.  We could clearly see the stone  from the left ureteral orifice otherwise there was no foreign bodies tumors or other abnormalities within the bladder.  I was able to snake a wire alongside the stone into the upper tract on the left side.  As I did so that stone moved back away from the orifice a bit.  I then introduced an open-ended catheter injected a small amount of contrast  which opacified the left renal pelvis.  I then began drawing back on fluid from the left renal pelvis and I drew back about 50 cc of frankly purulent fluid.  This was sent to microbiology for culture.  Because of infection in the upper tract I felt that stent placement was most appropriate and deferring ureteroscopy.  I injected contrast just enough to opacify the collecting system.  I measured the length of the ureter.  I then advanced and deployed a 6 Martiniquais by 26 cm double-J stent in standard fashion.  When in appropriate position the wire was removed deploying the stent in place.  Fluoroscopy showed a good coil in the renal pelvis.  Direct vision showed a good coil in the bladder.  Immediately I could see purulent efflux from the holes of the stent suggesting relief of obstruction.  I removed the cystoscope.  I introduced an 18 Martiniquais catheter without difficulty.  Put that to gravity drainage.  Patient was then awoken from anesthesia and transported to the recovery room stable condition.     Follow up plan:   Transfer to recovery then to the floor.  Await culture results.  He will need 2 weeks of antibiotics before definitive  ureteroscopy    Operative Findings (including complications, if any):   Mildly stenotic coronal hypospadias   Mild-to-moderate bulbar urethral stricture   Left distal ureteral stone   Left upper tract purulence           Implants: * No implants in log *    Specimens:   Specimen (24h ago, onward)      None                    Condition: Good    Disposition: PACU - hemodynamically stable.

## 2022-10-15 NOTE — PROGRESS NOTES
[facility] MEDICINE  PROGRESS NOTE        CHIEF COMPLAINT   Hospital follow up    HOSPITAL COURSE   57-year-old male with no reported past medical history who was seen at an outside ER 2 days ago and diagnosed with an 8 mm obstructing left-sided ureteral stone with hydronephrosis.  He also was diagnosed with a UTI and given a prescription for Levaquin but presented to the ER tonight with worsening symptoms of pain in the left flank region.  His found to have a mild leukocytosis, mild acute kidney injury but remainder of laboratory work was unremarkable.  He was seen by Urology in the ER with plans for lithotripsy in the morning.  Hospitalist was consulted for admission.    Today  Seen examined this morning.    Still having some flank pain.  Had a fever of 100.8 overnight as well as leukocytosis likely from the stone/complicated UTI.  He stated he was not having fevers as outpatient.        OBJECTIVE/PHYSICAL EXAM     VITAL SIGNS (MOST RECENT):  Temp: 98.8 °F (37.1 °C) (10/15/22 0437)  Pulse: 103 (10/15/22 0756)  Resp: 16 (10/15/22 0750)  BP: 128/71 (10/15/22 0756)  SpO2: 95 % (10/15/22 0756) VITAL SIGNS (24 HOUR RANGE):  Temp:  [98.2 °F (36.8 °C)-100.8 °F (38.2 °C)] 98.8 °F (37.1 °C)  Pulse:  [] 103  Resp:  [16-22] 16  SpO2:  [95 %-99 %] 95 %  BP: (112-135)/(58-79) 128/71   GENERAL: In no acute distress, afebrile  HEENT:  CHEST: Clear to auscultation bilaterally  HEART: S1, S2, no appreciable murmur  ABDOMEN: Soft, nontender, BS +  MSK: Warm, no lower extremity edema, no clubbing or cyanosis  NEUROLOGIC: Alert and oriented x4, moving all extremities with good strength   INTEGUMENTARY:  PSYCHIATRY:        ASSESSMENT/PLAN   Obstructing left ureterolithiasis   Obstructive acute kidney injury  Acute complicated urinary tract infection   Sepsis       Urology following.  Lithotripsy and stent placement today.    Obtain urine culture.  Continue on Levaquin for now.  Discontinue  IV fluids later today at 7:00 p.m.    Anticipated discharge and disposition:   __________________________________________________________________________    LABS/MICRO/MEDS/DIAGNOSTICS       LABS  Recent Labs     10/15/22  0518   *   K 4.1   CHLORIDE 98   CO2 26   BUN 12.8   CREATININE 1.21*   GLUCOSE 99   CALCIUM 9.2   ALKPHOS 80   AST 23   ALT 29   ALBUMIN 2.6*     Recent Labs     10/15/22  0518   WBC 12.6*   RBC 3.89*   HCT 36.0*   MCV 92.5          MICROBIOLOGY  Microbiology Results (last 7 days)       Procedure Component Value Units Date/Time    Urine Culture High Risk [[id number]] Collected: 10/15/22 0730    Order Status: Sent Specimen: Urine, Clean Catch Updated: 10/15/22 0749            MEDICATIONS   levoFLOXacin  500 mg Oral Daily    tamsulosin  1 capsule Oral QHS      INFUSIONS   lactated ringers 100 mL/hr at 10/14/22 2150       DIAGNOSTIC TESTS  CT Previous   Final Result      CT Previous   Final Result               All diagnosis and differential diagnosis have been reviewed; assessment and plan has been documented. I have personally reviewed the labs and test results that are presently available; I have reviewed the patients medication list. I have reviewed the consulting providers response and recommendations. I have reviewed or attempted to review medical records based upon their availability.  All of the patient's questions have been addressed and answered. Patient's is agreeable to the above stated plan. I will continue to monitor closely and make adjustments to medical management as needed.  This document was created using M*Modal Fluency Direct.  Transcription errors may have been made.  Please contact me if any questions may rise regarding documentation to clarify verbiage.        Sharad Villalobos MD   10/15/2022   Internal Medicine

## 2022-10-15 NOTE — H&P
[facility] Hospital Medicine History & Physical Examination       Patient Name: Steven Lejeune  MRN: [id number]  Patient Class: IP- Inpatient   Admission Date: 10/14/2022 10:34 AM  Length of Stay: 0  Admitting Service: Hospital Medicine   Attending Physician: Skip Alexander MD   Primary Care Provider: Finesse Hyman MD  History source: EMR, patient and/or patient's family    CHIEF COMPLAINT   Flank Pain, Vomiting, and Nausea (Left flank pain x5 days ago, went to [facility] ER 2 days ago and was dx with ureterolithiasis, CT scan done. Referred to urologist however patient reports pain worse, denies N/V. )      HISTORY OF PRESENT ILLNESS:   It is a 57-year-old male with no reported past medical history who was seen at an outside ER 2 days ago and diagnosed with an 8 mm obstructing left-sided ureteral stone with hydronephrosis.  He also was diagnosed with a UTI and given a prescription for Levaquin but presented to the ER tonight with worsening symptoms of pain in the left flank region.  His found to have a mild leukocytosis, mild acute kidney injury but remainder of laboratory work was unremarkable.  He was seen by Urology in the ER with plans for lithotripsy in the morning.  Hospitalist was consulted for admission.    PAST MEDICAL HISTORY:   History reviewed. No pertinent past medical history.    PAST SURGICAL HISTORY:   History reviewed. No pertinent surgical history.    ALLERGIES:   Patient has no known allergies.    FAMILY HISTORY:   Reviewed and non-contributory     SOCIAL HISTORY:     Social History     Tobacco Use    Smoking status: Not on file    Smokeless tobacco: Not on file   Substance Use Topics    Alcohol use: Not on file        HOME MEDICATIONS:     Prior to Admission medications    Medication Sig Start Date End Date Taking? Authorizing Provider   HYDROcodone-acetaminophen (NORCO)  mg per tablet Take 1 tablet by mouth every 4 (four) hours as needed. for  pain. 10/13/22   Historical Provider   levoFLOXacin (LEVAQUIN) 500 MG tablet Take 500 mg by mouth once daily. 10/12/22   Historical Provider   tamsulosin (FLOMAX) 0.4 mg Cap Take 1 capsule by mouth every evening. at bedtime 10/12/22   Historical Provider       REVIEW OF SYSTEMS:   Except as documented, all other systems reviewed and negative     PHYSICAL EXAM:   T (!) 100.8 °F (38.2 °C)   /68   P (!) 113   RR (!) 22   O2 95 %  GENERAL: awake, alert, oriented and in no acute distress, non-toxic appearing   HEENT: normocephalic atraumatic   NECK: supple   LUNGS: Clear bilaterally, no wheezing or rales, no accessory muscle use   CVS: Regular rate and rhythm, normal peripheral perfusion  ABD: Soft, left flank pain, non-distended, bowel sounds present  EXTREMITIES: no clubbing or cyanosis  SKIN: Warm, dry.   NEURO: alert and oriented, grossly without focal deficits   PSYCHIATRIC: Cooperative    LABS AND IMAGING:     Recent Labs     10/14/22  1158   WBC 14.0*   RBC 4.40*   HGB 13.8*   HCT 41.5*   MCV 94.3*   MCH 31.4*   MCHC 33.3   RDW 12.1        No results for input(s): LACTIC in the last 72 hours.  No results for input(s): INR, APTT, D-DIMER in the last 72 hours.  No results for input(s): HGBA1C, CHOL, TRIG, LDL, VLDL, HDL in the last 72 hours.   Recent Labs     10/14/22  1216   *   K 4.7   CHLORIDE 99   CO2 26   BUN 12.6   CREATININE 1.41*   GLUCOSE 100   CALCIUM 9.1   ALBUMIN 3.3*   GLOBULIN 3.8*   ALKPHOS 75   ALT 33   AST 26   BILITOT 0.9     No results for input(s): BNP, CPK, TROPONINI in the last 72 hours.       No image results found.      ASSESSMENT & PLAN:   Obstructing left ureterolithiasis  Mild acute kidney injury    -IV fluids, analgesics and continue Levaquin   -NPO after midnight for lithotripsy  -urology following    DVT prophylaxis: SCDs  Code status: full     If patient was admitted under observational status it is with my approval/permission.     At least 55 min was spent on this  history and physical.  Time seen: 9 PM   Skip Alexander MD

## 2022-10-15 NOTE — ANESTHESIA PREPROCEDURE EVALUATION
10/15/2022  Steven Lejeune is a 57 y.o., male.    Obstructing left ureterolithiasis  Mild acute kidney injury    Na 132, K 4.1, Cr 1.21  12/36/361k  Methamphetamine & marijuana usage -- last use 6-7 days ago per patient  No other significant medical history  Poor dentition with some loose teeth     Pre-op Assessment    I have reviewed the Patient Summary Reports.     I have reviewed the Nursing Notes. I have reviewed the NPO Status.   I have reviewed the Medications.     Review of Systems  Social:  Smoker + methamphetamines and marijuana usage   Hematology/Oncology:     Oncology Normal     EENT/Dental:EENT/Dental Normal   Cardiovascular:  Cardiovascular Normal Exercise tolerance: good     Pulmonary:  Pulmonary Normal    Renal/:  Renal/ Normal     Hepatic/GI:  Hepatic/GI Normal    Musculoskeletal:  Musculoskeletal Normal    Neurological:  Neurology Normal    Endocrine:  Endocrine Normal    Psych:  Psychiatric Normal           Physical Exam  General: Anxious, Alert and Oriented    Airway:  Mallampati: II   Mouth Opening: Normal  TM Distance: Normal  Tongue: Normal    Dental:  Periodontal disease  Poor dentition  Chest/Lungs:  Normal Respiratory Rate    Heart:  Rhythm: Regular Rhythm        Anesthesia Plan  Type of Anesthesia, risks & benefits discussed:    Anesthesia Type: Gen Supraglottic Airway  Intra-op Monitoring Plan: Standard ASA Monitors  Post Op Pain Control Plan: multimodal analgesia  Induction:  IV  Informed Consent: Informed consent signed with the Patient and all parties understand the risks and agree with anesthesia plan.  All questions answered.   ASA Score: 2  Day of Surgery Review of History & Physical: H&P Update referred to the surgeon/provider.    Ready For Surgery From Anesthesia Perspective.     .

## 2022-10-15 NOTE — ANESTHESIA PROCEDURE NOTES
Intubation    Date/Time: 10/15/2022 9:16 AM  Performed by: Efraín Peralta  Authorized by: Joel Briggs MD     Intubation:     Induction:  Intravenous    Intubated:  Postinduction    Mask Ventilation:  Easy mask    Attempts:  1    Attempted By:  Student    Difficult Airway Encountered?: No      Complications:  None    Airway Device:  Supraglottic airway/LMA    Airway Device Size:  4.0    Style/Cuff Inflation:  Cuffed (inflated to minimal occlusive pressure)    Secured at:  The lips    Placement Verified By:  Capnometry    Complicating Factors:  None    Findings Post-Intubation:  BS equal bilateral

## 2022-10-15 NOTE — TRANSFER OF CARE
"Anesthesia Transfer of Care Note    Patient: Steven Lejeune    Procedure(s) Performed: Procedure(s) (LRB):  REMOVAL, CALCULUS, URETER, URETEROSCOPIC (Left)    Patient location: PACU    Anesthesia Type: general    Transport from OR: Transported from OR on room air with adequate spontaneous ventilation    Post pain: adequate analgesia    Post assessment: no apparent anesthetic complications    Post vital signs: stable    Level of consciousness: sedated    Nausea/Vomiting: no nausea/vomiting    Complications: none    Transfer of care protocol was followed      Last vitals:   Visit Vitals  /71 (BP Location: Right arm, Patient Position: Lying)   Pulse 103   Temp 37.1 °C (98.8 °F) (Oral)   Resp 16   Ht 5' 11.65" (1.82 m)   Wt 83 kg (182 lb 15.7 oz)   SpO2 95%   BMI 25.06 kg/m²     "

## 2022-10-16 LAB
ANION GAP SERPL CALC-SCNC: 9 MEQ/L
BASOPHILS # BLD AUTO: 0.03 X10(3)/MCL (ref 0–0.2)
BASOPHILS NFR BLD AUTO: 0.2 %
BUN SERPL-MCNC: 18.9 MG/DL (ref 8.4–25.7)
CALCIUM SERPL-MCNC: 9.2 MG/DL (ref 8.4–10.2)
CHLORIDE SERPL-SCNC: 100 MMOL/L (ref 98–107)
CO2 SERPL-SCNC: 26 MMOL/L (ref 22–29)
CREAT SERPL-MCNC: 1.03 MG/DL (ref 0.73–1.18)
CREAT/UREA NIT SERPL: 18
EOSINOPHIL # BLD AUTO: 0.01 X10(3)/MCL (ref 0–0.9)
EOSINOPHIL NFR BLD AUTO: 0.1 %
ERYTHROCYTE [DISTWIDTH] IN BLOOD BY AUTOMATED COUNT: 11.9 % (ref 11.5–17)
GFR SERPLBLD CREATININE-BSD FMLA CKD-EPI: >60 MLS/MIN/1.73/M2
GLUCOSE SERPL-MCNC: 138 MG/DL (ref 74–100)
HCT VFR BLD AUTO: 33.4 % (ref 42–52)
HGB BLD-MCNC: 11.3 GM/DL (ref 14–18)
IMM GRANULOCYTES # BLD AUTO: 0.08 X10(3)/MCL (ref 0–0.04)
IMM GRANULOCYTES NFR BLD AUTO: 0.6 %
LYMPHOCYTES # BLD AUTO: 0.7 X10(3)/MCL (ref 0.6–4.6)
LYMPHOCYTES NFR BLD AUTO: 5.4 %
MCH RBC QN AUTO: 31.2 PG (ref 27–31)
MCHC RBC AUTO-ENTMCNC: 33.8 MG/DL (ref 33–36)
MCV RBC AUTO: 92.3 FL (ref 80–94)
MONOCYTES # BLD AUTO: 1.18 X10(3)/MCL (ref 0.1–1.3)
MONOCYTES NFR BLD AUTO: 9 %
NEUTROPHILS # BLD AUTO: 11.1 X10(3)/MCL (ref 2.1–9.2)
NEUTROPHILS NFR BLD AUTO: 84.7 %
NRBC BLD AUTO-RTO: 0 %
PLATELET # BLD AUTO: 288 X10(3)/MCL (ref 130–400)
PMV BLD AUTO: 11.9 FL (ref 7.4–10.4)
POTASSIUM SERPL-SCNC: 4.2 MMOL/L (ref 3.5–5.1)
RBC # BLD AUTO: 3.62 X10(6)/MCL (ref 4.7–6.1)
SODIUM SERPL-SCNC: 135 MMOL/L (ref 136–145)
WBC # SPEC AUTO: 13.1 X10(3)/MCL (ref 4.5–11.5)

## 2022-10-16 PROCEDURE — 80048 BASIC METABOLIC PNL TOTAL CA: CPT | Performed by: UROLOGY

## 2022-10-16 PROCEDURE — 36415 COLL VENOUS BLD VENIPUNCTURE: CPT | Performed by: UROLOGY

## 2022-10-16 PROCEDURE — 11000001 HC ACUTE MED/SURG PRIVATE ROOM

## 2022-10-16 PROCEDURE — 85025 COMPLETE CBC W/AUTO DIFF WBC: CPT | Performed by: UROLOGY

## 2022-10-16 PROCEDURE — 63600175 PHARM REV CODE 636 W HCPCS: Performed by: INTERNAL MEDICINE

## 2022-10-16 PROCEDURE — 25000003 PHARM REV CODE 250: Performed by: UROLOGY

## 2022-10-16 RX ADMIN — TAMSULOSIN HYDROCHLORIDE 0.4 MG: 0.4 CAPSULE ORAL at 09:10

## 2022-10-16 RX ADMIN — MUPIROCIN: 20 OINTMENT TOPICAL at 09:10

## 2022-10-16 RX ADMIN — PHENAZOPYRIDINE HYDROCHLORIDE 100 MG: 100 TABLET ORAL at 12:10

## 2022-10-16 RX ADMIN — SODIUM CHLORIDE, POTASSIUM CHLORIDE, SODIUM LACTATE AND CALCIUM CHLORIDE 1000 ML: 600; 310; 30; 20 INJECTION, SOLUTION INTRAVENOUS at 03:10

## 2022-10-16 RX ADMIN — HYDROCODONE BITARTRATE AND ACETAMINOPHEN 1 TABLET: 10; 325 TABLET ORAL at 06:10

## 2022-10-16 RX ADMIN — HYDROCODONE BITARTRATE AND ACETAMINOPHEN 1 TABLET: 10; 325 TABLET ORAL at 08:10

## 2022-10-16 RX ADMIN — PHENAZOPYRIDINE HYDROCHLORIDE 100 MG: 100 TABLET ORAL at 06:10

## 2022-10-16 RX ADMIN — PHENAZOPYRIDINE HYDROCHLORIDE 100 MG: 100 TABLET ORAL at 08:10

## 2022-10-16 RX ADMIN — LEVOFLOXACIN 500 MG: 500 TABLET, FILM COATED ORAL at 08:10

## 2022-10-16 RX ADMIN — HYDROCODONE BITARTRATE AND ACETAMINOPHEN 1 TABLET: 10; 325 TABLET ORAL at 04:10

## 2022-10-16 RX ADMIN — MUPIROCIN: 20 OINTMENT TOPICAL at 08:10

## 2022-10-16 NOTE — PROGRESS NOTES
[facility] - 8th Floor Med Surg  Urology  Progress Note    Patient Name: Steven Lejeune  MRN: [id number]  Admission Date: 10/14/2022  Hospital Length of Stay: 2 days  Code Status: Full Code   Attending Provider:  Wilfredo  Primary Care Physician: Finesse Hyman MD    Subjective:     HPI:Doing well; afebrile; wants to go home      Interval History: fells well ; wants to go him    Review of Systems  Objective:     Temp:  [97.4 °F (36.3 °C)-98.4 °F (36.9 °C)] 97.7 °F (36.5 °C)  Pulse:  [] 85  Resp:  [11-20] 17  SpO2:  [95 %-100 %] 96 %  BP: (101-131)/(57-79) 115/69     Body mass index is 25.06 kg/m².    Date 10/16/22 0700 - 10/17/22 0659   Shift 6736-0200 8602-4895 7212-4778 24 Hour Total   INTAKE   Shift Total(mL/kg)       OUTPUT   Urine(mL/kg/hr) 600   600   Shift Total(mL/kg) 600(7.23)   600(7.23)   Weight (kg) 83 83 83 83          Lines/Drains/Airways       Drain  Duration                  Urethral Catheter 10/15/22 0956 Silicone 18 Fr. <1 day              Peripheral Intravenous Line  Duration                  Peripheral IV - Single Lumen 10/14/22 1121 20 G Left;Posterior Hand 1 day                    Physical Exam    Significant Labs:  BMP:  Recent Labs   Lab 10/14/22  1216 10/15/22  0518 10/16/22  0528   * 132* 135*   K 4.7 4.1 4.2   CO2 26 26 26   BUN 12.6 12.8 18.9   CREATININE 1.41* 1.21* 1.03   GLUCOSE 100 99 138*   CALCIUM 9.1 9.2 9.2       CBC:  Recent Labs   Lab 10/14/22  1158 10/15/22  0518 10/16/22  0528   WBC 14.0* 12.6* 13.1*   HGB 13.8* 12.1* 11.3*   HCT 41.5* 36.0* 33.4*    361 288       Blood Culture: No results for input(s): LABBLOO in the last 168 hours.  Recent Lab Results         10/16/22  0528   10/15/22  0957        Anion Gap 9.0         Baso # 0.03         Basophil % 0.2         BUN 18.9         BUN/CREAT RATIO 18         Calcium 9.2         Chloride 100         CO2 26         Creatinine 1.03         eGFR >60         Eos # 0.01         Eosinophil % 0.1          Glucose 138         Hematocrit 33.4         Hemoglobin 11.3         Immature Grans (Abs) 0.08         Immature Granulocytes 0.6         Lymph # 0.70         LYMPH % 5.4         MCH 31.2         MCHC 33.8         MCV 92.3         Mono # 1.18         Mono % 9.0         MPV 11.9         Neut # 11.1         Neut % 84.7         nRBC 0.0         Platelets 288         Potassium 4.2         RBC 3.62         RDW 11.9         Sodium 135         Urine Culture, Routine   No Growth At 24 Hours  [P]       WBC 13.1                  [P] - Preliminary Result               Significant Imaging:  All pertinent imaging results/findings from the past 24 hours have been reviewed.    Assessment/Plan:     Active Diagnoses:    Diagnosis Date Noted POA    PRINCIPAL PROBLEM:  Ureterolithiasis [N20.1] 10/14/2022 Yes      Problems Resolved During this Admission:       VTE Risk Mitigation (From admission, onward)           Ordered     IP VTE HIGH RISK PATIENT  Once         10/14/22 2141     Place sequential compression device  Until discontinued         10/14/22 2141                    Yusuf Zimmerman MD  D/C jim  If voids well may d/c on leviquin and f/U with Dr Flores  Urology  [facility] - 8th Floor Med Surg

## 2022-10-16 NOTE — PROGRESS NOTES
[facility] MEDICINE  PROGRESS NOTE        CHIEF COMPLAINT   Hospital follow up    HOSPITAL COURSE   57-year-old male with no reported past medical history who was seen at an outside ER 2 days ago and diagnosed with an 8 mm obstructing left-sided ureteral stone with hydronephrosis.  He also was diagnosed with a UTI and given a prescription for Levaquin but presented to the ER tonight with worsening symptoms of pain in the left flank region.  His found to have a mild leukocytosis, mild acute kidney injury but remainder of laboratory work was unremarkable.  He was seen by Urology in the ER with plans for lithotripsy in the morning.  Hospitalist was consulted for admission.  Urology was consulted and October 15 was taken for a cystoscopy with left double-J stent placement.  There was 50 cc of kb purulence fluid noted from the operative report.    Today  Seen examined this morning.    Pending culture results.  He is feeling better this morning.  Afebrile.        OBJECTIVE/PHYSICAL EXAM     VITAL SIGNS (MOST RECENT):  Temp: 97.7 °F (36.5 °C) (10/16/22 0739)  Pulse: 85 (10/16/22 0739)  Resp: 16 (10/16/22 0739)  BP: 115/69 (10/16/22 0739)  SpO2: 96 % (10/16/22 0739) VITAL SIGNS (24 HOUR RANGE):  Temp:  [97.4 °F (36.3 °C)-98.4 °F (36.9 °C)] 97.7 °F (36.5 °C)  Pulse:  [] 85  Resp:  [11-20] 16  SpO2:  [95 %-100 %] 96 %  BP: (101-131)/(57-79) 115/69   GENERAL: In no acute distress, afebrile  HEENT:  CHEST: Clear to auscultation bilaterally  HEART: S1, S2, no appreciable murmur  ABDOMEN: Soft, nontender, BS +  MSK: Warm, no lower extremity edema, no clubbing or cyanosis  NEUROLOGIC: Alert and oriented x4, moving all extremities with good strength   INTEGUMENTARY:  PSYCHIATRY:        ASSESSMENT/PLAN   Obstructing left ureterolithiasis   Obstructive acute kidney injury  Acute complicated urinary tract infection   Sepsis       Urology following.  Will need 14 days of antibiotic  therapy prior to any ureteroscopy per urology.  Follow-up urine culture and operative culture .  Continue on Levaquin for now.     Anticipated discharge and disposition:   __________________________________________________________________________    LABS/MICRO/MEDS/DIAGNOSTICS       LABS  Recent Labs     10/15/22  0518 10/16/22  0528   * 135*   K 4.1 4.2   CHLORIDE 98 100   CO2 26 26   BUN 12.8 18.9   CREATININE 1.21* 1.03   GLUCOSE 99 138*   CALCIUM 9.2 9.2   ALKPHOS 80  --    AST 23  --    ALT 29  --    ALBUMIN 2.6*  --      Recent Labs     10/16/22  0528   WBC 13.1*   RBC 3.62*   HCT 33.4*   MCV 92.3          MICROBIOLOGY  Microbiology Results (last 7 days)       Procedure Component Value Units Date/Time    Urine Culture High Risk [[id number]] Collected: 10/15/22 0730    Order Status: Completed Specimen: Urine, Clean Catch Updated: 10/16/22 0651     Urine Culture No Growth At 24 Hours    Urine culture [[id number]] Collected: 10/15/22 0957    Order Status: Completed Specimen: Urine from Kidney, Left Updated: 10/16/22 0651     Urine Culture No Growth At 24 Hours    Urine Culture [id number] [[id number]] Collected: 10/15/22 1020    Order Status: Canceled Specimen: Urine, Kidney left Updated: 10/15/22 1112    Urine Culture [id number] [[id number]] Collected: 10/15/22 1007    Order Status: Canceled Specimen: Urine, Kidney left     Urine Culture [id number] [[id number]] Collected: 10/15/22 0944    Order Status: Canceled Specimen: Urine, Kidney left             MEDICATIONS   levoFLOXacin  500 mg Oral Daily    mupirocin   Nasal BID    phenazopyridine  100 mg Oral TID WM    tamsulosin  1 capsule Oral QHS      INFUSIONS        DIAGNOSTIC TESTS  SURG FL Surgery Fluoro Usage   Final Result      CT Previous   Final Result      CT Previous   Final Result               All diagnosis and differential diagnosis have been reviewed; assessment and plan has been documented. I have personally reviewed the labs and test results that are  presently available; I have reviewed the patients medication list. I have reviewed the consulting providers response and recommendations. I have reviewed or attempted to review medical records based upon their availability.  All of the patient's questions have been addressed and answered. Patient's is agreeable to the above stated plan. I will continue to monitor closely and make adjustments to medical management as needed.  This document was created using M*Modal Fluency Direct.  Transcription errors may have been made.  Please contact me if any questions may rise regarding documentation to clarify verbiage.        Sharad Villalobos MD   10/16/2022   Internal Medicine

## 2022-10-16 NOTE — PLAN OF CARE
Problem: Adult Inpatient Plan of Care  Goal: Plan of Care Review  10/16/2022 0244 by Cuong Conklin RN  Outcome: Ongoing, Progressing  10/16/2022 0243 by Cuong Conklin RN  Outcome: Ongoing, Progressing       Problem: Infection  Goal: Absence of Infection Signs and Symptoms  10/16/2022 0244 by Cuong Conklin RN  Outcome: Ongoing, Progressing  10/16/2022 0243 by Cuong Conklin RN  Outcome: Ongoing, Progressing  Intervention: Prevent or Manage Infection  Flowsheets (Taken 10/16/2022 0244)  Fever Reduction/Comfort Measures: lightweight clothing     Problem: Pain Acute  Goal: Acceptable Pain Control and Functional Ability  Outcome: Ongoing, Progressing  Intervention: Develop Pain Management Plan  Flowsheets (Taken 10/16/2022 0244)  Pain Management Interventions:   care clustered   medication offered   quiet environment facilitated  Intervention: Prevent or Manage Pain  Flowsheets (Taken 10/16/2022 0244)  Sleep/Rest Enhancement:   awakenings minimized   noise level reduced   regular sleep/rest pattern promoted   room darkened  Sensory Stimulation Regulation:   care clustered   quiet environment promoted  Bowel Elimination Promotion: privacy promoted  Medication Review/Management: medications reviewed  Intervention: Optimize Psychosocial Wellbeing  Flowsheets (Taken 10/16/2022 0244)  Supportive Measures: active listening utilized

## 2022-10-17 VITALS
DIASTOLIC BLOOD PRESSURE: 50 MMHG | OXYGEN SATURATION: 95 % | WEIGHT: 183 LBS | SYSTOLIC BLOOD PRESSURE: 93 MMHG | RESPIRATION RATE: 16 BRPM | BODY MASS INDEX: 24.79 KG/M2 | HEART RATE: 89 BPM | TEMPERATURE: 98 F | HEIGHT: 72 IN

## 2022-10-17 LAB
BACTERIA UR CULT: NO GROWTH
BACTERIA UR CULT: NO GROWTH

## 2022-10-17 PROCEDURE — 25000003 PHARM REV CODE 250: Performed by: UROLOGY

## 2022-10-17 RX ORDER — LEVOFLOXACIN 500 MG/1
500 TABLET, FILM COATED ORAL DAILY
Qty: 7 TABLET | Refills: 0 | Status: SHIPPED | OUTPATIENT
Start: 2022-10-17 | End: 2022-10-24

## 2022-10-17 RX ADMIN — HYDROCODONE BITARTRATE AND ACETAMINOPHEN 1 TABLET: 10; 325 TABLET ORAL at 04:10

## 2022-10-17 RX ADMIN — PHENAZOPYRIDINE HYDROCHLORIDE 100 MG: 100 TABLET ORAL at 09:10

## 2022-10-17 NOTE — PLAN OF CARE
10/17/22 0929   Discharge Assessment   Assessment Type Discharge Planning Assessment   Confirmed/corrected address, phone number and insurance Yes   Confirmed Demographics Correct on Facesheet   Source of Information patient   Does patient/caregiver understand observation status   (inpatient)   Communicated ALFIE with patient/caregiver Yes   Reason For Admission ureterolithiasis   Lives With significant other   Facility Arrived From: home   Do you expect to return to your current living situation? Yes   Do you have help at home or someone to help you manage your care at home? Yes   Who are your caregiver(s) and their phone number(s)? geovanny teresa   Prior to hospitilization cognitive status: Alert/Oriented;No Deficits   Current cognitive status: Alert/Oriented;No Deficits   Walking or Climbing Stairs Difficulty none   Dressing/Bathing Difficulty none   Equipment Currently Used at Home none   Readmission within 30 days? No   Patient currently being followed by outpatient case management? No   Do you currently have service(s) that help you manage your care at home? No   Do you take prescription medications? Yes   Do you have prescription coverage? No   Do you have any problems affording any of your prescribed medications? No   Who is going to help you get home at discharge? geovanny teresa   How do you get to doctors appointments? car, drives self;family or friend will provide   Are you on dialysis? No   Do you take coumadin? No   Discharge Plan A Home   Discharge Plan B Home   DME Needed Upon Discharge  none   Discharge Plan discussed with: Patient   Discharge Barriers Identified None   Physical Activity   On average, how many days per week do you engage in moderate to strenuous exercise (like a brisk walk)? 0 days   On average, how many minutes do you engage in exercise at this level? 0 min   Financial Resource Strain   How hard is it for you to pay for the very basics like food, housing, medical care, and  heating? Not hard   Housing Stability   In the last 12 months, was there a time when you were not able to pay the mortgage or rent on time? N   In the last 12 months, how many places have you lived?   (1)   In the last 12 months, was there a time when you did not have a steady place to sleep or slept in a shelter (including now)? N   Transportation Needs   In the past 12 months, has lack of transportation kept you from medical appointments or from getting medications? no   In the past 12 months, has lack of transportation kept you from meetings, work, or from getting things needed for daily living? No   Food Insecurity   Within the past 12 months, you worried that your food would run out before you got the money to buy more. Never true   Within the past 12 months, the food you bought just didn't last and you didn't have money to get more. Never true   Stress   Do you feel stress - tense, restless, nervous, or anxious, or unable to sleep at night because your mind is troubled all the time - these days? Not at all   Social Connections   In a typical week, how many times do you talk on the phone with family, friends, or neighbors? More than 3   How often do you get together with friends or relatives? More than 3   How often do you attend Adventist or Tenriism services? Never   Do you belong to any clubs or organizations such as Adventist groups, unions, fraternal or athletic groups, or school groups? No   How often do you attend meetings of the clubs or organizations you belong to? Never   Are you , , , , never , or living with a partner? Living with   Alcohol Use   Q1: How often do you have a drink containing alcohol? Never   Q3: How often do you have six or more drinks on one occasion? Never   Relationship/Environment   Name(s) of Who Lives With Patient geovanny teresa      10/17/22 6045   Discharge Assessment   Assessment Type Discharge Planning Assessment   Confirmed/corrected  address, phone number and insurance Yes   Confirmed Demographics Correct on Facesheet   Source of Information patient   Does patient/caregiver understand observation status   (inpatient)   Communicated ALFIE with patient/caregiver Yes   Reason For Admission ureterolithiasis   Lives With significant other   Facility Arrived From: home   Do you expect to return to your current living situation? Yes   Do you have help at home or someone to help you manage your care at home? Yes   Who are your caregiver(s) and their phone number(s)? geovanny teresa   Prior to hospitilization cognitive status: Alert/Oriented;No Deficits   Current cognitive status: Alert/Oriented;No Deficits   Walking or Climbing Stairs Difficulty none   Dressing/Bathing Difficulty none   Equipment Currently Used at Home none   Readmission within 30 days? No   Patient currently being followed by outpatient case management? No   Do you currently have service(s) that help you manage your care at home? No   Do you take prescription medications? Yes   Do you have prescription coverage? No   Do you have any problems affording any of your prescribed medications? No   Who is going to help you get home at discharge? geovanny teresa   How do you get to doctors appointments? car, drives self;family or friend will provide   Are you on dialysis? No   Do you take coumadin? No   Discharge Plan A Home   Discharge Plan B Home   DME Needed Upon Discharge  none   Discharge Plan discussed with: Patient   Discharge Barriers Identified None   Physical Activity   On average, how many days per week do you engage in moderate to strenuous exercise (like a brisk walk)? 0 days   On average, how many minutes do you engage in exercise at this level? 0 min   Financial Resource Strain   How hard is it for you to pay for the very basics like food, housing, medical care, and heating? Not hard   Housing Stability   In the last 12 months, was there a time when you were not able to pay  the mortgage or rent on time? N   In the last 12 months, how many places have you lived?   (1)   In the last 12 months, was there a time when you did not have a steady place to sleep or slept in a shelter (including now)? N   Transportation Needs   In the past 12 months, has lack of transportation kept you from medical appointments or from getting medications? no   In the past 12 months, has lack of transportation kept you from meetings, work, or from getting things needed for daily living? No   Food Insecurity   Within the past 12 months, you worried that your food would run out before you got the money to buy more. Never true   Within the past 12 months, the food you bought just didn't last and you didn't have money to get more. Never true   Stress   Do you feel stress - tense, restless, nervous, or anxious, or unable to sleep at night because your mind is troubled all the time - these days? Not at all   Social Connections   In a typical week, how many times do you talk on the phone with family, friends, or neighbors? More than 3   How often do you get together with friends or relatives? More than 3   How often do you attend Latter-day or Samaritan services? Never   Do you belong to any clubs or organizations such as Latter-day groups, unions, fraternal or athletic groups, or school groups? No   How often do you attend meetings of the clubs or organizations you belong to? Never   Are you , , , , never , or living with a partner? Living with   Alcohol Use   Q1: How often do you have a drink containing alcohol? Never   Q3: How often do you have six or more drinks on one occasion? Never   Relationship/Environment   Name(s) of Who Lives With Patient geovanny pro   Indep .with ADL, is employed, sig. O0ther Geovanny Pro is present in the room.  Pt is being discharged today.  No needs ID

## 2022-10-17 NOTE — DISCHARGE SUMMARY
[facility]  HOSPITAL MEDICINE   DISCHARGE SUMMARY    Patient Name: Steven Lejeune  MRN: [id number]  Admission Date: 10/14/2022  Hospital Length of Stay: 3 days  Discharge Date and Time: 10/17/2022  Discharge Provider: Sharad Villalobos  Primary Care Provider: Finesse Hyman MD      HOSPITAL COURSE   57-year-old male with no reported past medical history who was seen at an outside ER 2 days ago and diagnosed with an 8 mm obstructing left-sided ureteral stone with hydronephrosis.  He also was diagnosed with a UTI and given a prescription for Levaquin but presented to the ER tonight with worsening symptoms of pain in the left flank region.  His found to have a mild leukocytosis, mild acute kidney injury but remainder of laboratory work was unremarkable.  He was seen by Urology in the ER with plans for lithotripsy in the morning.  Hospitalist was consulted for admission.  Urology was consulted and October 15 was taken for a cystoscopy with left double-J stent placement.  There was 50 cc of kb purulence fluid noted from the operative report.  Urology recommended 14 day course of antibiotics, cultures so far remain negative likely from the prior antibiotic use of Levaquin at home.  Since he has cleared with Levaquin we will continue Levaquin upon discharge I have prescribed him another 7 days, he should have another 5 days already at home from previous prescription.  He needs to complete both of these prescriptions.  Otherwise will plan to discharge him home today.  He was cleared by Urology yesterday.  Plan for outpatient follow-up with Dr. Flores.        PHYSICAL EXAM     Most Recent Vital Signs:  Temp: 97.8 °F (36.6 °C) (10/17/22 0700)  Pulse: 89 (10/17/22 0700)  Resp: 16 (10/17/22 0700)  BP: (!) 93/50 (10/17/22 0700)  SpO2: 95 % (10/17/22 0700)     GENERAL: In no acute distress, afebrile  HEENT:  CHEST: Clear to auscultation bilaterally  HEART: S1, S2, no appreciable murmur  ABDOMEN: Soft,  nontender, BS +  MSK: Warm, no lower extremity edema, no clubbing or cyanosis  NEUROLOGIC: Alert and oriented x4, moving all extremities with good strength   INTEGUMENTARY:  PSYCHIATRY:          DISCHARGE DIAGNOSIS   Obstructing left ureterolithiasis -status post stent October 15  Obstructive acute kidney injury  Acute complicated urinary tract infection   Sepsis         _____________________________________________________________________________      DISCHARGE MED REC     Current Discharge Medication List        CONTINUE these medications which have CHANGED    Details   levoFLOXacin (LEVAQUIN) 500 MG tablet Take 1 tablet (500 mg total) by mouth once daily. Complete the remaining levaquin at home as well for 7 days  Qty: 7 tablet, Refills: 0           CONTINUE these medications which have NOT CHANGED    Details   HYDROcodone-acetaminophen (NORCO)  mg per tablet Take 1 tablet by mouth every 4 (four) hours as needed. for pain.      tamsulosin (FLOMAX) 0.4 mg Cap Take 1 capsule by mouth every evening. at bedtime                CONSULTS     Consults (From admission, onward)          Status Ordering Provider     Inpatient consult to Urology  Once        Provider:  Chavo Flores MD    Acknowledged CHAVO FLORES              FOLLOW UP      Follow-up Information       Chavo Flores MD Follow up in 2 week(s).    Specialty: Urology  Why: Office will call patient on follow up appt.  [facility] information:  [facility] [address] [address]  [phone]                                 DISCHARGE INSTRUCTIONS     Explained in detail to the patient about the discharge plan, medications, and follow-up visits. Pt understands and agrees with the treatment plan.  Discharged Condition: stable  Diet as tolerated  Activities as tolerated  Discharge to:  Home     TIME SPENT ON DISCHARGE   35 minutes        Sharad oLyola M.D, on 10/17/2022  Internal Medicine  Department of [facility] Medicine    This document was  created using electronic dictation services.  Please excuse any errors that may have been made.  Contact me if any questions regarding documentation to clarify verbiage.

## 2022-10-17 NOTE — PLAN OF CARE
Problem: Adult Inpatient Plan of Care  Goal: Plan of Care Review  Outcome: Ongoing, Progressing  Goal: Patient-Specific Goal (Individualized)  Outcome: Ongoing, Progressing  Goal: Absence of Hospital-Acquired Illness or Injury  Outcome: Ongoing, Progressing  Goal: Optimal Comfort and Wellbeing  Outcome: Ongoing, Progressing  Goal: Readiness for Transition of Care  Outcome: Ongoing, Progressing     Problem: Infection  Goal: Absence of Infection Signs and Symptoms  Outcome: Ongoing, Progressing  Intervention: Prevent or Manage Infection  Flowsheets (Taken 10/17/2022 0532)  Fever Reduction/Comfort Measures: lightweight clothing     Problem: Pain Acute  Goal: Acceptable Pain Control and Functional Ability  Outcome: Ongoing, Progressing  Intervention: Develop Pain Management Plan  Flowsheets (Taken 10/17/2022 0532)  Pain Management Interventions:   care clustered   medication offered   quiet environment facilitated  Intervention: Prevent or Manage Pain  Flowsheets (Taken 10/17/2022 0532)  Sleep/Rest Enhancement:   awakenings minimized   regular sleep/rest pattern promoted   family presence promoted  Sensory Stimulation Regulation: care clustered  Bowel Elimination Promotion: privacy promoted  Medication Review/Management: medications reviewed

## 2022-10-18 ENCOUNTER — PATIENT OUTREACH (OUTPATIENT)
Dept: ADMINISTRATIVE | Facility: CLINIC | Age: 57
End: 2022-10-18
Payer: MEDICAID

## 2022-10-18 ENCOUNTER — TELEPHONE (OUTPATIENT)
Dept: ADMINISTRATIVE | Facility: CLINIC | Age: 57
End: 2022-10-18
Payer: MEDICAID

## 2022-10-18 ENCOUNTER — PATIENT MESSAGE (OUTPATIENT)
Dept: ADMINISTRATIVE | Facility: OTHER | Age: 57
End: 2022-10-18
Payer: MEDICAID

## 2022-10-18 NOTE — PROGRESS NOTES
C3 nurse spoke with Steven Lejeune for a TCC post hospital discharge follow up call, call completed. The patient does not have a scheduled HOSFU yet, message routed to Finesse Hyman MD & Chavo Flores MD.

## 2022-10-19 ENCOUNTER — PATIENT MESSAGE (OUTPATIENT)
Dept: ADMINISTRATIVE | Facility: OTHER | Age: 57
End: 2022-10-19
Payer: MEDICAID

## (undated) DEVICE — CATH POLLACK OPEN-END FLEXI-TI

## (undated) DEVICE — Device

## (undated) DEVICE — SYR 30CC LUER LOCK

## (undated) DEVICE — GLOVE PROTEXIS HYDROGEL SZ7.5

## (undated) DEVICE — BOWL STERILE LARGE 32OZ

## (undated) DEVICE — SYR 10CC LUER LOCK

## (undated) DEVICE — IMPLANTABLE DEVICE
Type: IMPLANTABLE DEVICE | Site: URETER | Status: NON-FUNCTIONAL
Removed: 2022-10-15

## (undated) DEVICE — SUPPORT ULNA NERVE PROTECTOR

## (undated) DEVICE — SOL NACL IRR 1000ML BTL

## (undated) DEVICE — SENSOR DUAL FLEX STR 150CM

## (undated) DEVICE — TRAY SKIN SCRUB WET PREMIUM

## (undated) DEVICE — POSITIONER HEAD ADULT

## (undated) DEVICE — BAG PRESSURE INFUSER 3000CC

## (undated) DEVICE — CONTAINER SPECIMEN SCREW 4OZ

## (undated) DEVICE — METER URINE DRAIN BAG 400ML

## (undated) DEVICE — BAG DRAIN UROLOGY AND HOSE

## (undated) DEVICE — CYSTOGRAFIN CONTRAST MEDIA 30%

## (undated) DEVICE — ADAPTER STOPCOCK FEMALE LL